# Patient Record
Sex: FEMALE | Race: WHITE | Employment: STUDENT | URBAN - METROPOLITAN AREA
[De-identification: names, ages, dates, MRNs, and addresses within clinical notes are randomized per-mention and may not be internally consistent; named-entity substitution may affect disease eponyms.]

---

## 2022-01-28 ENCOUNTER — OFFICE VISIT (OUTPATIENT)
Dept: RHEUMATOLOGY | Facility: CLINIC | Age: 22
End: 2022-01-28
Payer: COMMERCIAL

## 2022-01-28 ENCOUNTER — LAB ENCOUNTER (OUTPATIENT)
Dept: LAB | Facility: HOSPITAL | Age: 22
End: 2022-01-28
Attending: INTERNAL MEDICINE
Payer: COMMERCIAL

## 2022-01-28 VITALS
HEART RATE: 91 BPM | SYSTOLIC BLOOD PRESSURE: 120 MMHG | BODY MASS INDEX: 18.07 KG/M2 | DIASTOLIC BLOOD PRESSURE: 86 MMHG | WEIGHT: 122 LBS | HEIGHT: 69 IN

## 2022-01-28 DIAGNOSIS — R76.8 POSITIVE ANA (ANTINUCLEAR ANTIBODY): ICD-10-CM

## 2022-01-28 DIAGNOSIS — M25.50 POLYARTHRALGIA: Primary | ICD-10-CM

## 2022-01-28 DIAGNOSIS — M25.50 POLYARTHRALGIA: ICD-10-CM

## 2022-01-28 LAB
CK SERPL-CCNC: 53 U/L
CRP SERPL-MCNC: <0.29 MG/DL (ref ?–0.3)
ERYTHROCYTE [SEDIMENTATION RATE] IN BLOOD: 7 MM/HR
RHEUMATOID FACT SERPL-ACNC: <10 IU/ML (ref ?–15)

## 2022-01-28 PROCEDURE — 86431 RHEUMATOID FACTOR QUANT: CPT | Performed by: INTERNAL MEDICINE

## 2022-01-28 PROCEDURE — 3074F SYST BP LT 130 MM HG: CPT | Performed by: INTERNAL MEDICINE

## 2022-01-28 PROCEDURE — 86140 C-REACTIVE PROTEIN: CPT | Performed by: INTERNAL MEDICINE

## 2022-01-28 PROCEDURE — 86235 NUCLEAR ANTIGEN ANTIBODY: CPT

## 2022-01-28 PROCEDURE — 36415 COLL VENOUS BLD VENIPUNCTURE: CPT | Performed by: INTERNAL MEDICINE

## 2022-01-28 PROCEDURE — 86225 DNA ANTIBODY NATIVE: CPT

## 2022-01-28 PROCEDURE — 99204 OFFICE O/P NEW MOD 45 MIN: CPT | Performed by: INTERNAL MEDICINE

## 2022-01-28 PROCEDURE — 85652 RBC SED RATE AUTOMATED: CPT | Performed by: INTERNAL MEDICINE

## 2022-01-28 PROCEDURE — 82164 ANGIOTENSIN I ENZYME TEST: CPT

## 2022-01-28 PROCEDURE — 3008F BODY MASS INDEX DOCD: CPT | Performed by: INTERNAL MEDICINE

## 2022-01-28 PROCEDURE — 83516 IMMUNOASSAY NONANTIBODY: CPT

## 2022-01-28 PROCEDURE — 3079F DIAST BP 80-89 MM HG: CPT | Performed by: INTERNAL MEDICINE

## 2022-01-28 PROCEDURE — 82550 ASSAY OF CK (CPK): CPT

## 2022-01-28 PROCEDURE — 86200 CCP ANTIBODY: CPT | Performed by: INTERNAL MEDICINE

## 2022-01-28 PROCEDURE — 86036 ANCA SCREEN EACH ANTIBODY: CPT

## 2022-01-28 PROCEDURE — 86038 ANTINUCLEAR ANTIBODIES: CPT

## 2022-01-28 PROCEDURE — 86039 ANTINUCLEAR ANTIBODIES (ANA): CPT

## 2022-01-28 NOTE — PROGRESS NOTES
Eduardayohan Dickinson is a 19year old female who presents for Patient presents with:  Consult  Test Results  Joint Pain: Also swelling in the joints  .    HPI:   CC: joint pain  Consult: self-referred     This is a 20 yo F no medical history presents with joint p psoriasis  HEENT: No dry eyes, no dry mouth, no Raynaud's, no nasal ulcers, no parotid swelling, no neck pain, no jaw pain, no temple pain  Eyes: No visual changes,   CVS: No chest pain, no heart disease  RS: No SOB, no Cough, No Pleurtic pain,   GI: No na would like to see her in person    Thank you for allowing me to participate in this patients care.  Pt will f/u in 2 weeks    Caroline Small MD  1/28/2022  1:55 PM

## 2022-01-31 LAB
ANGIOTENSIN CONVERTING ENZYME: 53 U/L
JO-1 (HISTIDY1-TRNA SYNTHETASE) AB, IGG: 0 AU/ML

## 2022-02-01 LAB
CCP IGG SERPL-ACNC: 1.7 U/ML (ref 0–6.9)
DSDNA AB TITR SER: <10 {TITER}

## 2022-02-02 LAB
ANA NUCLEOLAR TITR SER IF: 320 {TITER}
ENA SM IGG SER QL: NEGATIVE
ENA SM+RNP AB SER QL: NEGATIVE
ENA SS-A AB SER QL IA: NEGATIVE
ENA SS-B AB SER QL IA: NEGATIVE
MYELOPEROX ANTIBODIES, IGG: 0 AU/ML
SERINE PROTEASE 3, IGG: 1 AU/ML

## 2022-03-08 ENCOUNTER — OFFICE VISIT (OUTPATIENT)
Dept: OBGYN CLINIC | Facility: CLINIC | Age: 22
End: 2022-03-08
Payer: COMMERCIAL

## 2022-03-08 VITALS — DIASTOLIC BLOOD PRESSURE: 60 MMHG | WEIGHT: 126 LBS | SYSTOLIC BLOOD PRESSURE: 99 MMHG | BODY MASS INDEX: 19 KG/M2

## 2022-03-08 DIAGNOSIS — N92.3 INTERMENSTRUAL SPOTTING: ICD-10-CM

## 2022-03-08 DIAGNOSIS — N93.0 POSTCOITAL BLEEDING: ICD-10-CM

## 2022-03-08 DIAGNOSIS — R10.2 PELVIC PAIN: ICD-10-CM

## 2022-03-08 DIAGNOSIS — N89.8 VAGINAL DISCHARGE: ICD-10-CM

## 2022-03-08 DIAGNOSIS — R30.0 DYSURIA: Primary | ICD-10-CM

## 2022-03-08 DIAGNOSIS — Z87.440 HISTORY OF RECURRENT UTIS: ICD-10-CM

## 2022-03-08 DIAGNOSIS — N92.6 IRREGULAR MENSES: ICD-10-CM

## 2022-03-08 DIAGNOSIS — N89.8 VAGINAL ODOR: ICD-10-CM

## 2022-03-08 PROCEDURE — 99203 OFFICE O/P NEW LOW 30 MIN: CPT | Performed by: NURSE PRACTITIONER

## 2022-03-08 PROCEDURE — 3074F SYST BP LT 130 MM HG: CPT | Performed by: NURSE PRACTITIONER

## 2022-03-08 PROCEDURE — 3078F DIAST BP <80 MM HG: CPT | Performed by: NURSE PRACTITIONER

## 2022-03-09 LAB
C TRACH DNA SPEC QL NAA+PROBE: NEGATIVE
N GONORRHOEA DNA SPEC QL NAA+PROBE: NEGATIVE

## 2022-03-10 LAB
GENITAL VAGINOSIS SCREEN: NEGATIVE
TRICHOMONAS SCREEN: NEGATIVE

## 2022-03-11 ENCOUNTER — TELEPHONE (OUTPATIENT)
Dept: OBGYN CLINIC | Facility: CLINIC | Age: 22
End: 2022-03-11

## 2022-03-11 NOTE — TELEPHONE ENCOUNTER
Pt just wanted the results of her 3/8/22 lab results. Pt called and informed of results and recommendations. Advised pt will call her with her urine culture results when those are received. Pt voices understanding.

## 2022-03-15 ENCOUNTER — LAB ENCOUNTER (OUTPATIENT)
Dept: LAB | Facility: HOSPITAL | Age: 22
End: 2022-03-15
Attending: NURSE PRACTITIONER
Payer: COMMERCIAL

## 2022-03-15 DIAGNOSIS — R30.0 DYSURIA: ICD-10-CM

## 2022-03-15 PROCEDURE — 87086 URINE CULTURE/COLONY COUNT: CPT

## 2022-03-15 RX ORDER — SULFAMETHOXAZOLE AND TRIMETHOPRIM 800; 160 MG/1; MG/1
1 TABLET ORAL 2 TIMES DAILY
Qty: 6 TABLET | Refills: 0 | Status: SHIPPED | OUTPATIENT
Start: 2022-03-15 | End: 2022-03-18

## 2022-03-15 NOTE — TELEPHONE ENCOUNTER
Pt calling for an update stating it is painful when going to the bathroom and wants to know if anything can be prescribed.

## 2022-03-15 NOTE — TELEPHONE ENCOUNTER
Pt Name and  verified. Pt reports burning with urination, urinary urgency and frequency. Pt denies fever or low back pain. Denies any allergies to bactrim. Pt will go to the lab today to get a urine culture done that was placed. Advised to start anbx after she completes the urine culture. Per UTI protocol this RN to send Rx to pharmacy for Bactrim DS 1 tab po BID x 3 days. RN also advises to push fluids, drink cranberry juice. RN also advises patient to return call to office if symptoms do not resolve once treatment completed    Pt verbalizes understanding and agrees with plan.

## 2022-03-17 ENCOUNTER — TELEPHONE (OUTPATIENT)
Dept: OBGYN CLINIC | Facility: CLINIC | Age: 22
End: 2022-03-17

## 2022-03-17 NOTE — TELEPHONE ENCOUNTER
Encounter placed to review lab work and symptoms. Due to history of intermenstrual spotting, pelvic ultrasound ordered and scheduling instructions provided. Pt verbalized understanding and questions answered.

## 2022-04-06 ENCOUNTER — HOSPITAL ENCOUNTER (OUTPATIENT)
Dept: ULTRASOUND IMAGING | Age: 22
Discharge: HOME | End: 2022-04-06
Attending: NURSE PRACTITIONER
Payer: COMMERCIAL

## 2022-04-06 DIAGNOSIS — N92.3 INTERMENSTRUAL SPOTTING: ICD-10-CM

## 2022-04-06 PROCEDURE — 76830 TRANSVAGINAL US NON-OB: CPT | Performed by: NURSE PRACTITIONER

## 2022-04-06 PROCEDURE — 76856 US EXAM PELVIC COMPLETE: CPT | Performed by: NURSE PRACTITIONER

## 2022-04-11 ENCOUNTER — TELEPHONE (OUTPATIENT)
Dept: OBGYN CLINIC | Facility: CLINIC | Age: 22
End: 2022-04-11

## 2022-04-20 ENCOUNTER — OFFICE VISIT (OUTPATIENT)
Dept: RHEUMATOLOGY | Facility: CLINIC | Age: 22
End: 2022-04-20
Payer: COMMERCIAL

## 2022-04-20 ENCOUNTER — LAB ENCOUNTER (OUTPATIENT)
Dept: LAB | Facility: HOSPITAL | Age: 22
End: 2022-04-20
Attending: INTERNAL MEDICINE
Payer: COMMERCIAL

## 2022-04-20 VITALS
WEIGHT: 125 LBS | DIASTOLIC BLOOD PRESSURE: 83 MMHG | SYSTOLIC BLOOD PRESSURE: 118 MMHG | BODY MASS INDEX: 18.51 KG/M2 | HEIGHT: 69 IN | HEART RATE: 91 BPM

## 2022-04-20 DIAGNOSIS — M79.89 SWELLING OF LEFT HAND: ICD-10-CM

## 2022-04-20 DIAGNOSIS — M25.50 POLYARTHRALGIA: Primary | ICD-10-CM

## 2022-04-20 LAB
CRP SERPL-MCNC: <0.29 MG/DL (ref ?–0.3)
ERYTHROCYTE [SEDIMENTATION RATE] IN BLOOD: 8 MM/HR
RHEUMATOID FACT SERPL-ACNC: <10 IU/ML (ref ?–15)

## 2022-04-20 PROCEDURE — 86200 CCP ANTIBODY: CPT | Performed by: INTERNAL MEDICINE

## 2022-04-20 PROCEDURE — 86140 C-REACTIVE PROTEIN: CPT | Performed by: INTERNAL MEDICINE

## 2022-04-20 PROCEDURE — 36415 COLL VENOUS BLD VENIPUNCTURE: CPT | Performed by: INTERNAL MEDICINE

## 2022-04-20 PROCEDURE — 99213 OFFICE O/P EST LOW 20 MIN: CPT | Performed by: INTERNAL MEDICINE

## 2022-04-20 PROCEDURE — 3079F DIAST BP 80-89 MM HG: CPT | Performed by: INTERNAL MEDICINE

## 2022-04-20 PROCEDURE — 85652 RBC SED RATE AUTOMATED: CPT | Performed by: INTERNAL MEDICINE

## 2022-04-20 PROCEDURE — 86431 RHEUMATOID FACTOR QUANT: CPT | Performed by: INTERNAL MEDICINE

## 2022-04-20 PROCEDURE — 3074F SYST BP LT 130 MM HG: CPT | Performed by: INTERNAL MEDICINE

## 2022-04-20 PROCEDURE — 3008F BODY MASS INDEX DOCD: CPT | Performed by: INTERNAL MEDICINE

## 2022-04-20 NOTE — PROGRESS NOTES
Sheldon Bar is a 19year old female. HPI:   No chief complaint on file. I had the pleasure of seeing Sheldon Bar on 4/20/2022 for follow up b/l hand pain and nodules. Current Medications:  none  Blood work:  +AKASH 1:320 speckled, neg MARCE panel, Baylee-1  Neg ESR, CRP, RF, CCP, CK, ACE, ANCA    Interval History: This is a 20 yo F no medical history presents with joint pain and nodules on her fingers. She recently moved from [place] for school. She is currently attending [employer] for optometry. About a year ago she noted nodules developing on her PIPs and DIPs. There would be very tender and red. They would come and go. She also developed them on her feet. At times she has some stiffness in her hands but no swelling. She reports some knee pain intermittently. No swelling. About a year ago she also had pruritus on her bilateral thighs but no rash. It went away  At times her tips of her fingers will feel sore around her nailbed. Denies any dry skin  About 3 weeks ago she had some sores in her mouth but it cleared. Denies any dry eyes or dry mouth, lymphadenopathy, alopecia, rash, serositis, DVT or PE, miscarriages or RP. At times she will have some shortness of breath in the morning. Denies any dysphagia  Denies any muscle weakness   She was seen by rheumatologist in [place] and blood work showed a positive AKASH. She never followed up because she moved to [facility]. Today, 4/20/2022:  Presents for f/u of b/l hand pain  About a month ago her left hand around her thumb region became swollen, and a blackish and blue color. It was not very painful. She also developed some redness around her MCPs and PIPs. Symptoms can last 10 to 15 minutes and can happen 2-3 times a month. Right hand is not as bad  Nodules on the fingers have resolved  No other joint pain or swelling, rashes      HISTORY:  No past medical history on file.    Social Hx Reviewed   Family Hx Reviewed     Medications (Active prior to today's visit):  No current outpatient medications on file. .cmed  Allergies:  No Known Allergies      ROS:   All other ROS are negative. PHYSICAL EXAM:   GEN: AAOx3, NAD  HEENT: EOMI, PERRLA, no injection or icterus, oral mucosa moist, no oral lesions. No lymphadenopathy. No facial rash  CVS: RRR, no murmurs rubs or gallops. Equal 2+ distal pulses. LUNGS: CTAB, no increased work of breathing  ABDOMEN:  soft NT/ND, +BS, no HSM  SKIN: No rashes or skin lesions. No nail findings  MSK:  Cervical spine: FROM  Hands: no synovitis in DIP, PIP and MCP, strong full fists  Wrist: FROM, no pain or swelling or warmth on palpation  Elbow: FROM, no pain or swelling or warmth on palpation  Shoulders: FROM, no pain or swelling or warmth on palpation  Hip: normal log roll, no lateral hip pain, BHARGAV test negative b/l  Knees: FROM, no warmth or effusion present. No pain with ROM. Ankles: FROM, no pain or swelling or warmth on palpation  Feet: no pain with MTP squeeze, no toe swelling or pain or warmth on palpation with FROM  Spine: no lumbar or sacral pain on palpation. NEURO: Cranial nerves II-XII intact grossly. 5/5 strength throughout in both upper and lower extremities, sensation intact. PSYCH: normal mood       LABS:     Component      Latest Ref Rng & Units 1/28/2022   MYELOPEROX ANTIBODIES, IGG      0 - 19 AU/mL 0   SERINE PROTEASE3, IGG      0 - 19 AU/mL 1   ANCA IFA Titer      <1:20 <1:20   ANCA IFA Pattern      None Detected None Detected   AKASH Titer/Pattern      <80 320 (A)   Reviewed By:       Larissa Green M.D.    Anti-Sjogren's A      Negative Negative   Anti-Sjogren's B      Negative Negative   Anti-Smith Antibody      Negative Negative   Anti-Wolf/RNP Antibody      Negative Negative   C-REACTIVE PROTEIN      <0.30 mg/dL <0.29   C-Citrullinated Peptide IgG AB      0.0 - 6.9 U/mL 1.7   SED RATE      0 - 20 mm/Hr 7   RHEUMATOID FACTOR      <15 IU/mL <10   AKASH SCREEN WITH REFLEX (S) Negative Positive (A)   Baylee-1 (Histidy1-tRNA Synthetase) Ab, IgG      0 - 40 AU/mL 0   Anti Double Strand DNA      <10 <10   CK      26 - 192 U/L 53   ANGIOTENSIN CONVERTING ENZYME,      9 - 67 U/L 53       Imaging:     None    ASSESSMENT/PLAN:     B/L hand pain/swelling  - She has been experiencing some swelling in her left thumb with blackish and bluish discoloration. Not very painful  - Work-up for inflammatory arthritis has been negative.   No other joint pain or swelling  - Plan to repeat blood work  - May have to consider getting ultrasound or MRI of her left hand for further evaluation    Pt will f/u if symptoms recur     Jace Chen MD  4/20/2022   4:26 PM

## 2022-04-20 NOTE — PATIENT INSTRUCTIONS
You were seen today for some of the swelling in your left hand  Lets get blood work again  May have to consider ultrasound or MRI of the left hand depending on the results

## 2022-04-22 ENCOUNTER — TELEPHONE (OUTPATIENT)
Dept: OBGYN CLINIC | Facility: CLINIC | Age: 22
End: 2022-04-22

## 2022-04-22 LAB — CCP IGG SERPL-ACNC: 1.3 U/ML (ref 0–6.9)

## 2022-04-22 NOTE — TELEPHONE ENCOUNTER
Reviewed normal pelvic US results and continue right sided lower abdomina pain. Consider following up with a PCP for GI provider.  Pt agrees to GI referral.

## 2022-05-17 ENCOUNTER — OFFICE VISIT (OUTPATIENT)
Dept: GASTROENTEROLOGY | Facility: CLINIC | Age: 22
End: 2022-05-17
Payer: COMMERCIAL

## 2022-05-17 VITALS
HEIGHT: 69 IN | DIASTOLIC BLOOD PRESSURE: 70 MMHG | SYSTOLIC BLOOD PRESSURE: 104 MMHG | WEIGHT: 127 LBS | BODY MASS INDEX: 18.81 KG/M2

## 2022-05-17 DIAGNOSIS — R10.31 RLQ ABDOMINAL PAIN: Primary | ICD-10-CM

## 2022-05-17 PROCEDURE — 3074F SYST BP LT 130 MM HG: CPT | Performed by: INTERNAL MEDICINE

## 2022-05-17 PROCEDURE — 3078F DIAST BP <80 MM HG: CPT | Performed by: INTERNAL MEDICINE

## 2022-05-17 PROCEDURE — 99203 OFFICE O/P NEW LOW 30 MIN: CPT | Performed by: INTERNAL MEDICINE

## 2022-05-17 PROCEDURE — 3008F BODY MASS INDEX DOCD: CPT | Performed by: INTERNAL MEDICINE

## 2022-05-20 ENCOUNTER — HOSPITAL ENCOUNTER (OUTPATIENT)
Dept: CT IMAGING | Age: 22
Discharge: HOME | End: 2022-05-20
Attending: INTERNAL MEDICINE
Payer: COMMERCIAL

## 2022-05-20 DIAGNOSIS — R10.31 RLQ ABDOMINAL PAIN: ICD-10-CM

## 2022-05-20 LAB — CREAT BLD-MCNC: 0.9 MG/DL

## 2022-05-20 PROCEDURE — 74177 CT ABD & PELVIS W/CONTRAST: CPT | Performed by: INTERNAL MEDICINE

## 2022-05-20 PROCEDURE — 82565 ASSAY OF CREATININE: CPT

## 2022-05-23 ENCOUNTER — TELEPHONE (OUTPATIENT)
Dept: GASTROENTEROLOGY | Facility: CLINIC | Age: 22
End: 2022-05-23

## 2022-05-24 NOTE — TELEPHONE ENCOUNTER
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 We will have the patient follow back up with her gynecologist and okay for nursing staff to fax this to her GYN. Left a message message for the patient to call us back to review the above. Okay to give results. I would have her follow-up with me after she sees gynecology, we had discussed other options including colonoscopy if she has ongoing symptoms or issues but the above CT scan does not show any significant GI pathology at this time.
Patient contacted, verified and message from Dr. Ramone Poon given. Patient voiced understanding. CT results faxed to Dr. Warren Every at [phone] with return confirmation.
- - -

## 2022-08-09 ENCOUNTER — TELEPHONE (OUTPATIENT)
Dept: GASTROENTEROLOGY | Facility: CLINIC | Age: 22
End: 2022-08-09

## 2022-08-09 NOTE — TELEPHONE ENCOUNTER
1st reminder letter mailed and sent out to patient via my chart in regards to her overdue labs order:     IMMUNOGLOBULIN A, QUANT     TISSUE TRANSGLUTAMINASE AB IGA

## 2022-08-11 LAB
IMMUNOGLOBULIN A: 368 MG/DL (ref 47–310)
TISSUE TRANSGLUTAMINASE$ANTIBODY, IGA: <1 U/ML

## (undated) DIAGNOSIS — N92.3 INTERMENSTRUAL SPOTTING: Primary | ICD-10-CM

## (undated) DIAGNOSIS — R10.9 RIGHT SIDED ABDOMINAL PAIN: Primary | ICD-10-CM

## (undated) NOTE — LETTER
8/9/2022              Sheldon Bar        [address] [facility], [address] [facility]         Dear [place] Gregory,    [address] [facility] records indicate that the tests ordered for you by Natasha Torrez. Trupti Balderas MD  have not been done. If you have, in fact, already completed the tests or you do not wish to have the tests done, please contact our office at [phone]. Otherwise, please proceed with the testing. Enclosed is a duplicate order for your convenience. Labs order:             IMMUNOGLOBULIN A, QUANT                                  TISSUE TRANSGLUTAMINASE AB IGA    Please call [employer] to schedule this test order. Sincerely,    Natasha Torrez.  Trupti Balderas MD  [address]  [address] [facility] [address]  [address] [facility] [id number] [phone]